# Patient Record
(demographics unavailable — no encounter records)

---

## 2025-07-23 NOTE — OB HISTORY
[Definite:  ___ (Date)] : the last menstrual period was [unfilled] [Pregnancy History] : Vaginal delivery [___] : #2 ([unfilled]): [LMP: ___] : LMP: [unfilled] [ANUPAMA: ___] : ANUPAMA: [unfilled] [EGA: ___ wks] : EGA: [unfilled] wks [Spontaneous] : Spontaneous conception [Sonogram] : sonogram [at ___ wks] : at [unfilled] weeks [FreeTextEntry1] : [facility] to discuss pregnancy complicated by BMI and limited views on OB ultrasound. Pt with BMI >50. Pt had anatomy scan at OB office noted for limited views. Sono performed today, WNL report attached. Pt takes PNV and ASA. Pt denies any other PMH or PSH. Pt reports +FM, Denies VB LOF CTX/cramping

## 2025-07-23 NOTE — FAMILY HISTORY
[Age 35+ During Pregnancy] : not 35 or over during pregnancy [Reported Family History Of Birth Defects] : no congenital heart defects [Sundeep-Sachs Carrier] : no Sundeep-Sachs [Family History] : no mental retardation/autism [Reported Family History Of Genetic Disease] : no history of child defect in child of baby father

## 2025-07-23 NOTE — DISCUSSION/SUMMARY
[FreeTextEntry1] : We had the pleasure of seeing your patient for a Maternal-Fetal Medicine consultation today. She was extensively counseled regarding the following issues:  Obesity in pregnancy: Obesity is associated with an increased risk for pregnancy complications, including preeclampsia, surgical complications, and congenital anomalies. In addition, fetal malformations are more difficult to identify and evaluate by ultrasound. The [employer] [place] [employer] (IOM) recommends less weight gain during pregnancy for obese individuals (11-20 lbs) compared to those of normal weight (25-35 lbs), overweight (15-25 lbs), or underweight (28-40 lbs). These recommendations aim to minimize risks for both mother and baby.   Thank you for requesting a consultation on this patient. The total time spent in preparation for this visit, medical history taking, orders, review of records, counseling the patient, and writing this note was 30 minutes.  At the end of our discussion, the patient indicated that her questions were answered and she seemed satisfied with our discussion. Please do not hesitate to contact us with any questions.  Sincerely,   Ulysses Pratt MD, JOHNNIE Attending Physician, Maternal-Fetal Medicine